# Patient Record
Sex: FEMALE | Race: WHITE
[De-identification: names, ages, dates, MRNs, and addresses within clinical notes are randomized per-mention and may not be internally consistent; named-entity substitution may affect disease eponyms.]

---

## 2018-08-14 ENCOUNTER — HOSPITAL ENCOUNTER (INPATIENT)
Dept: HOSPITAL 36 - ER | Age: 83
LOS: 1 days | Discharge: ANOTHER HOSPITAL | DRG: 885 | End: 2018-08-15
Attending: PSYCHIATRY & NEUROLOGY | Admitting: PSYCHIATRY & NEUROLOGY
Payer: MEDICARE

## 2018-08-14 VITALS — DIASTOLIC BLOOD PRESSURE: 77 MMHG | SYSTOLIC BLOOD PRESSURE: 110 MMHG

## 2018-08-14 DIAGNOSIS — M19.90: ICD-10-CM

## 2018-08-14 DIAGNOSIS — Z82.49: ICD-10-CM

## 2018-08-14 DIAGNOSIS — I10: ICD-10-CM

## 2018-08-14 DIAGNOSIS — I25.10: ICD-10-CM

## 2018-08-14 DIAGNOSIS — G30.9: ICD-10-CM

## 2018-08-14 DIAGNOSIS — G93.40: ICD-10-CM

## 2018-08-14 DIAGNOSIS — F02.80: ICD-10-CM

## 2018-08-14 DIAGNOSIS — F31.9: Primary | ICD-10-CM

## 2018-08-14 LAB
ALBUMIN SERPL-MCNC: 4.2 GM/DL (ref 3.7–5.3)
ALBUMIN/GLOB SERPL: 1.5 {RATIO} (ref 1–1.8)
ALP SERPL-CCNC: 71 U/L (ref 34–104)
ALT SERPL-CCNC: 9 U/L (ref 7–52)
AMPHET UR-MCNC: NEGATIVE NG/ML
ANION GAP SERPL CALC-SCNC: 10.5 MMOL/L (ref 7–16)
APAP SERPL-MCNC: < 10 UG/ML (ref 10–30)
APPEARANCE UR: (no result)
AST SERPL-CCNC: 14 U/L (ref 13–39)
BACTERIA #/AREA URNS HPF: (no result) /HPF
BARBITURATES UR-MCNC: NEGATIVE UG/ML
BASOPHILS # BLD AUTO: 0 TH/CUMM (ref 0–0.2)
BASOPHILS NFR BLD AUTO: 0.4 % (ref 0–2)
BENZODIAZEPINES PNL UR: NEGATIVE
BILIRUB SERPL-MCNC: 0.4 MG/DL (ref 0.3–1)
BILIRUB UR-MCNC: NEGATIVE MG/DL
BUN SERPL-MCNC: 28 MG/DL (ref 7–25)
CALCIUM SERPL-MCNC: 9.9 MG/DL (ref 8.6–10.3)
CANNABINOIDS SERPL QL CFM: NEGATIVE
CHLORIDE SERPL-SCNC: 106 MEQ/L (ref 98–107)
CHOLEST SERPL-MCNC: 212 MG/DL (ref ?–200)
CO2 SERPL-SCNC: 26.1 MEQ/L (ref 21–31)
COCAINE METAB.OTHER UR-MCNC: NEGATIVE NG/ML
COLOR UR: YELLOW
CREAT SERPL-MCNC: 1.1 MG/DL (ref 0.6–1.2)
EOSINOPHIL # BLD AUTO: 0 TH/CMM (ref 0.1–0.4)
EOSINOPHIL NFR BLD AUTO: 0.5 % (ref 0–5)
EPI CELLS URNS QL MICRO: (no result) /LPF
ERYTHROCYTE [DISTWIDTH] IN BLOOD BY AUTOMATED COUNT: 13.5 % (ref 11.5–20)
GLOBULIN SER-MCNC: 2.9 GM/DL
GLUCOSE SERPL-MCNC: 128 MG/DL (ref 70–105)
GLUCOSE UR STRIP-MCNC: NEGATIVE MG/DL
HBA1C MFR BLD: 5.7 % (ref 4–6)
HCT VFR BLD CALC: 43.1 % (ref 41–60)
HDLC SERPL-MCNC: 48 MG/DL (ref 23–92)
HGB BLD-MCNC: 14.5 GM/DL (ref 12–16)
HGB BLD-MCNC: 17 G/DL (ref 4–35)
KETONES UR STRIP-MCNC: NEGATIVE MG/DL
LEUKOCYTE ESTERASE UR-ACNC: (no result)
LYMPHOCYTE AB SER FC-ACNC: 1.8 TH/CMM (ref 1.5–3)
LYMPHOCYTES NFR BLD AUTO: 26 % (ref 20–50)
MCH RBC QN AUTO: 29.7 PG (ref 27–31)
MCHC RBC AUTO-ENTMCNC: 33.7 PG (ref 28–36)
MCV RBC AUTO: 88.2 FL (ref 81–100)
METHADONE UR CFM-MCNC: NEGATIVE NG/ML
METHAMPHET UR QL: NEGATIVE
MICRO URNS: YES
MONOCYTES # BLD AUTO: 0.5 TH/CMM (ref 0.3–1)
MONOCYTES NFR BLD AUTO: 7.7 % (ref 2–10)
NEUTROPHILS # BLD: 4.5 TH/CMM (ref 1.8–8)
NEUTROPHILS NFR BLD AUTO: 65.4 % (ref 40–80)
NITRITE UR QL STRIP: POSITIVE
OPIATES UR QL: NEGATIVE
PCP UR-MCNC: NEGATIVE UG/L
PH UR STRIP: 6 [PH] (ref 4.6–8)
PLATELET # BLD: 248 TH/CMM (ref 150–400)
PMV BLD AUTO: 8.1 FL
POTASSIUM SERPL-SCNC: 3.6 MEQ/L (ref 3.5–5.1)
PROT UR STRIP-MCNC: NEGATIVE MG/DL
RBC # BLD AUTO: 4.88 MIL/CMM (ref 3.8–5.2)
RBC # UR STRIP: (no result) /UL
RBC #/AREA URNS HPF: (no result) /HPF (ref 0–5)
SALICYLATES SERPL-MCNC: < 25 MG/L (ref 30–100)
SODIUM SERPL-SCNC: 139 MEQ/L (ref 136–145)
SP GR UR STRIP: 1.02 (ref 1–1.03)
TRICYCLICS UR QL: NEGATIVE
TRIGL SERPL-MCNC: 179 MG/DL (ref ?–150)
URINALYSIS COMPLETE PNL UR: (no result)
UROBILINOGEN UR STRIP-ACNC: 0.2 E.U./DL (ref 0.2–1)
WBC # BLD AUTO: 6.8 TH/CMM (ref 4.8–10.8)

## 2018-08-14 PROCEDURE — Z7610: HCPCS

## 2018-08-14 NOTE — ED PHYSICIAN CHART
ED Chief Complaint/HPI





- Patient Information


Date Seen:: 18


Time Seen:: 14:50


Chief Complaint:: Agitation


History of Present Illness:: 





onset x 3 days of agitation and aggressive behavior; no report of trauma, SIs, H

/As, neck pain, C/P, SOB, Abd. Pain, A/N/V/D/C, fever, chills, or urinary s/s


Historian:: Patient, EMS


Review:: Nurse's Note Reviewed, Old Chart Reviewed, EMS run form Reviewed





ED Review of Systems





- Review of Systems


General/Constitutional: No fever, No chills, No weight loss, No weakness, No 

diaphoresis, No edema, No loss of appetite


Skin: No skin lesions, No rash, No bruising


Head: No headache, No light-headedness


Eyes: No loss of vision, No pain, No diplopia


ENT: No earache, No nasal drainage, No sore throat, No tinnitus


Neck: No neck pain, No swelling, No thyromegaly, No stiffness, No mass noted


Cardio Vascular: No chest pain, No palpitations, No PND, No orthopnea, No edema


Pulmonary: No SOB, No cough, No sputum, No wheezing


GI: No nausea, No vomiting, No diarrhea, No pain, No melena, No hematochezia, 

No constipation, No hematemesis


G/U: No dysuria, No frequency, No hematuria


Ob/Gyn: No vaginal discharge, No abnormal vaginal bleed, No contraction


Musculoskeletal: No bone or joint pain, No back pain, No muscle pain


Endocrine: No polyuria, No polydipsia


Psychiatric: Prior psych history, No depression, Anxiety, No suicidal ideation, 

No homicidal ideation, No auditory hallucination, No visual hallucination


Hematopoietic: No bruising, No lymphadenopathy


Allergic/Immuno: No urticaria, No angioedema


Neurological: No syncope, No focal symptoms, No weakness, No paresthesia, No 

headache, No seizure, No dizziness, Confusion, No vertigo





ED Past Medical History





- Past Medical History


Obtainable: Yes


Past Medical History: HTN, CAD, Arthritis, Dementia


Family History: HTN


Social History: Non Smoker, No Alcohol, No Drug Use, , Care Facility


Surgical History: None


Psychiatricy History: Bipolar, Dementia


Medication: Reviewed





Family Medical History





- Family Member


  ** Mother


History Unknown: Yes


Living Status: 





ED Physical Exam





- Physical Examination


General/Constitutional: Awake, Well-developed, well-nourished, Alert, No 

distress, GCS 15, Non-toxic appearing, Ambulatory


Head: Atraumatic


Eyes: Lids, conjuctiva normal, PERRL, EOMI


Skin: Nl inspection, No rash, No skin lesions, No ecchymosis, Well hydrated, No 

lymphadenopathy


ENMT: External ears, nose nl, TM canals nl, Nasal exam nl, Lips, teeth, gums nl

, Oropharynx nl, Tonsils nl


Neck: Nontender, Full ROM w/o pain, No JVD, No nuchal rigidity, No bruit, No 

mass, No stridor


Respiratory: Nl effort/Exclusion, Clear to Auscultation, No Wheeze/Rhonchi/Rales


Cardio Vascular: RRR, No murmur, gallop, rubs, NL S1 S2, Carotid/Femoral/Distal 

pulses equal bilaterally


GI: No tenderness/rebounding/guarding, No organomegaly, No hernia, Normal BS's, 

Nondistended, No mass/bruits, No McBurney tenderness


: No CVA tenderness


Extremities: No tenderness or effusion, Full ROM, normal strength in all 

extremities, No edema, Normal digits & nails


Neuro/Psych: Alert/oriented, DTR's symmetric, Normal sensory exam, Normal motor 

strength, Judgement/insight normal, Mood normal, Normal gait, No focal deficits


Other Neuro/Psych comments:: 





Disoriented and Confused; + Psychomotor Agitation; no SIs; Mood/Affect: Stable


Misc: Normal back, No paraspinal tenderness





ED Labs/Radiology/EKG Results





- Lab Results


Comments:: 





Reviewed





- EKG Interpretations


Comments:: 





unable to perform due to hostile pt





ED Septic Shock





- .


Is Septic Shock (SBP<90, OR Lactate>4 mmol\L) present?: No





ED Reassessment (Disposition)





- Reassessment


Reassessment Condition:: Improved





- Diagnosis


Diagnosis:: 





Agitation; Dementia; Alzheimer's Disease; Medical Clearance; Bipolar Disorder





- Aftercare/Follow up Instructions


Aftercare/Follow-Up Instructions:: Counseled pt regarding lab results/diagnosis 

& need follow up, Counseled pt & family regarding lab results/diagnosis & need 

follow up





- Patient Disposition


Discharge/Transfer:: Acute Care w/in this hosp


Admitted to:: [facility]


Condition at Disposition:: Stable, Improved

## 2018-08-14 NOTE — HISTORY & PHYSICAL
ADMIT DATE:  08/14/2018



CHIEF COMPLAINT:  The patient was admitted because of aggressive behavior and

increasing agitation.



HISTORY OF PRESENT ILLNESS:  The patient has been having severe agitation and

aggressive behavior for the last 3 days, was brought to the Emergency Room.  She

was medically cleared, was admitted to the Geropsych Unit for aggressive

behavior and psychosis.



REVIEW OF SYSTEMS:  Essentially negative.



PAST MEDICAL HISTORY:  Hypertension, arthritis, coronary artery disease, and

dementia.



PHYSICAL EXAMINATION:

HEAD:  Normal.

ENT:  Normal.

NECK:  Supple, nontender.

LUNGS:  Clear.

CARDIOVASCULAR SYSTEM:  S1, S2 heard.

ABDOMEN:  Soft.  Bowel sounds are heard.

CENTRAL NERVOUS SYSTEM:  The patient is very confused and is agitated.



DIAGNOSES:  Acute agitation, encephalopathy, history of coronary artery disease,

history of hypertension, history of bipolar disorder, and history of arthritis

was made.



PLAN:  The patient is being admitted.  I will follow the patient medically and

psychiatrist will see the patient.





DD: 08/14/2018 18:53

DT: 08/14/2018 20:01

JOB# [id number]  [id number]

## 2018-08-15 ENCOUNTER — HOSPITAL ENCOUNTER (INPATIENT)
Dept: HOSPITAL 36 - MSI | Age: 83
LOS: 5 days | Discharge: NURSING, REHAB OR LONG-TERM CARE | DRG: 690 | End: 2018-08-20
Attending: FAMILY MEDICINE | Admitting: FAMILY MEDICINE
Payer: MEDICARE

## 2018-08-15 VITALS — SYSTOLIC BLOOD PRESSURE: 154 MMHG | DIASTOLIC BLOOD PRESSURE: 70 MMHG

## 2018-08-15 DIAGNOSIS — I10: ICD-10-CM

## 2018-08-15 DIAGNOSIS — M19.90: ICD-10-CM

## 2018-08-15 DIAGNOSIS — N39.0: Primary | ICD-10-CM

## 2018-08-15 DIAGNOSIS — Z66: ICD-10-CM

## 2018-08-15 DIAGNOSIS — E86.0: ICD-10-CM

## 2018-08-15 DIAGNOSIS — B96.20: ICD-10-CM

## 2018-08-15 DIAGNOSIS — K21.9: ICD-10-CM

## 2018-08-15 DIAGNOSIS — I25.10: ICD-10-CM

## 2018-08-15 DIAGNOSIS — F03.91: ICD-10-CM

## 2018-08-15 PROCEDURE — Z7610: HCPCS

## 2018-08-15 PROCEDURE — X3401: HCPCS

## 2018-08-15 RX ADMIN — POTASSIUM CHLORIDE SCH MEQ: 750 TABLET, EXTENDED RELEASE ORAL at 09:40

## 2018-08-15 RX ADMIN — DEXTROSE AND SODIUM CHLORIDE SCH MLS/HR: 5; .45 INJECTION, SOLUTION INTRAVENOUS at 17:37

## 2018-08-15 RX ADMIN — POTASSIUM CHLORIDE SCH: 750 TABLET, EXTENDED RELEASE ORAL at 11:08

## 2018-08-15 NOTE — PSYCHIATRIC EVALUATION
DATE OF SERVICE:  08/15/2018



IDENTIFYING DATA:  The patient is an 86-year-old  woman, resident of

[facility].  Information obtained directly interviewing the patient as

well as reviewing the admission papers and they are reliable.



JUSTIFICATION OF HOSPITALIZATION:  The patient is admitted for her agitation,

confusion and striking out.



HISTORY OF PRESENT ILLNESS:  This patient is an 86-year-old  woman,

resident of [facility].  The patient is reported to have been agitated at

the facility and then they brought her over here.  Tried to interview the

patient, but the patient is noted to be very sedated and is not able to provide

much of information.  However, the patient has been responding to questioning

persistently, but the patient's responses are noted to be very slow.  The

patient has psychomotor retardation.



PAST PSYCHIATRIC HISTORY:  Details are not known.



MEDICAL HISTORY AND PHYSICAL EXAMINATION:  Requested done by Dr. Aragon.



SUBSTANCE ABUSE HISTORY:  Details are not known.



LEGAL PROBLEMS:  None at this time.



MENTAL STATUS EXAMINATION:  The patient is an 86-year-old thin built,

superficially cooperative, very drowsy.  Eye contact is poor.  Mood is noted to

be dysphoric.  The patient says psychotic symptoms could not be elicited.  The

patient at this time has been not able to provide much of any information.  The

patient is going to be maintained only on Ativan 0.5 mg on a p.r.n. basis. 

Possibly, the patient is going to be transferred to the Med/Surg Unit for

further medical stabilization before she is going to be taken care of her

psychiatric issues.



DIAGNOSTIC IMPRESSION:

AXIS I:  Dementia and behavioral change, secondary trait.

AXIS II:  None.

AXIS III:  As per Dr. Aragon.



IMMEDIATE TREATMENT PLAN:  The patient is going to be referred to the primary

care physician as possible transfer to the medical floor for stabilization.





DD: 08/15/2018 10:56

DT: 08/15/2018 15:33

JOB# [id number]  [id number]

## 2018-08-16 LAB
ALBUMIN SERPL-MCNC: 3.3 GM/DL (ref 3.7–5.3)
ALBUMIN/GLOB SERPL: 1.3 {RATIO} (ref 1–1.8)
ALP SERPL-CCNC: 53 U/L (ref 34–104)
ALT SERPL-CCNC: 8 U/L (ref 7–52)
ANION GAP SERPL CALC-SCNC: 8 MMOL/L (ref 7–16)
AST SERPL-CCNC: 14 U/L (ref 13–39)
BASOPHILS # BLD AUTO: 0 TH/CUMM (ref 0–0.2)
BASOPHILS NFR BLD AUTO: 0.6 % (ref 0–2)
BILIRUB SERPL-MCNC: 0.8 MG/DL (ref 0.3–1)
BUN SERPL-MCNC: 27 MG/DL (ref 7–25)
CALCIUM SERPL-MCNC: 9.2 MG/DL (ref 8.6–10.3)
CHLORIDE SERPL-SCNC: 107 MEQ/L (ref 98–107)
CO2 SERPL-SCNC: 25.7 MEQ/L (ref 21–31)
CREAT SERPL-MCNC: 0.9 MG/DL (ref 0.6–1.2)
EOSINOPHIL # BLD AUTO: 0.1 TH/CMM (ref 0.1–0.4)
EOSINOPHIL NFR BLD AUTO: 1.9 % (ref 0–5)
ERYTHROCYTE [DISTWIDTH] IN BLOOD BY AUTOMATED COUNT: 13.5 % (ref 11.5–20)
GLOBULIN SER-MCNC: 2.6 GM/DL
GLUCOSE SERPL-MCNC: 112 MG/DL (ref 70–105)
HCT VFR BLD CALC: 41.5 % (ref 41–60)
HGB BLD-MCNC: 14 GM/DL (ref 12–16)
LYMPHOCYTE AB SER FC-ACNC: 1.9 TH/CMM (ref 1.5–3)
LYMPHOCYTES NFR BLD AUTO: 37.7 % (ref 20–50)
MCH RBC QN AUTO: 30.2 PG (ref 27–31)
MCHC RBC AUTO-ENTMCNC: 33.8 PG (ref 28–36)
MCV RBC AUTO: 89.4 FL (ref 81–100)
MONOCYTES # BLD AUTO: 0.5 TH/CMM (ref 0.3–1)
MONOCYTES NFR BLD AUTO: 10.3 % (ref 2–10)
NEUTROPHILS # BLD: 2.6 TH/CMM (ref 1.8–8)
NEUTROPHILS NFR BLD AUTO: 49.5 % (ref 40–80)
PLATELET # BLD: 221 TH/CMM (ref 150–400)
PMV BLD AUTO: 8.7 FL
POTASSIUM SERPL-SCNC: 3.7 MEQ/L (ref 3.5–5.1)
RBC # BLD AUTO: 4.64 MIL/CMM (ref 3.8–5.2)
SODIUM SERPL-SCNC: 137 MEQ/L (ref 136–145)
WBC # BLD AUTO: 5.1 TH/CMM (ref 4.8–10.8)

## 2018-08-16 RX ADMIN — DEXTROSE AND SODIUM CHLORIDE SCH MLS/HR: 5; .45 INJECTION, SOLUTION INTRAVENOUS at 12:59

## 2018-08-16 RX ADMIN — POTASSIUM CHLORIDE SCH MEQ: 750 TABLET, EXTENDED RELEASE ORAL at 16:33

## 2018-08-16 NOTE — GENERAL PROGRESS NOTE
Objective





- Results


Result Diagrams: 


 08/16/18 05:30





 08/16/18 05:30


Recent Labs: 


 Laboratory Last Values











WBC  5.1 Th/cmm (4.8-10.8)   08/16/18  05:30    


 


RBC  4.64 Mil/cmm (3.80-5.20)   08/16/18  05:30    


 


Hgb  14.0 gm/dL (12-16)   08/16/18  05:30    


 


Hct  41.5 % (41.0-60)   08/16/18  05:30    


 


MCV  89.4 fl ()   08/16/18  05:30    


 


MCH  30.2 pg (27.0-31.0)   08/16/18  05:30    


 


MCHC Differential  33.8 pg (28.0-36.0)   08/16/18  05:30    


 


RDW  13.5 % (11.5-20.0)   08/16/18  05:30    


 


Plt Count  221 Th/cmm (150-400)   08/16/18  05:30    


 


MPV  8.7 fl  08/16/18  05:30    


 


Neutrophils %  49.5 % (40.0-80.0)   08/16/18  05:30    


 


Lymphocytes %  37.7 % (20.0-50.0)   08/16/18  05:30    


 


Monocytes %  10.3 % (2.0-10.0)  H  08/16/18  05:30    


 


Eosinophils %  1.9 % (0.0-5.0)   08/16/18  05:30    


 


Basophils %  0.6 % (0.0-2.0)   08/16/18  05:30    


 


Sodium  137 mEq/L (136-145)   08/16/18  05:30    


 


Potassium  3.7 mEq/L (3.5-5.1)   08/16/18  05:30    


 


Chloride  107 mEq/L ()   08/16/18  05:30    


 


Carbon Dioxide  25.7 mEq/L (21.0-31.0)   08/16/18  05:30    


 


Anion Gap  8.0  (7.0-16.0)   08/16/18  05:30    


 


BUN  27 mg/dL (7-25)  H  08/16/18  05:30    


 


Creatinine  0.9 mg/dL (0.6-1.2)   08/16/18  05:30    


 


Est GFR ( Amer)  TNP   08/16/18  05:30    


 


Est GFR (Non-Af Amer)  TNP   08/16/18  05:30    


 


BUN/Creatinine Ratio  30.0   08/16/18  05:30    


 


Glucose  112 mg/dL ()  H  08/16/18  05:30    


 


POC Glucose  88 MG/DL (70 - 105)   08/15/18  16:10    


 


Calcium  9.2 mg/dL (8.6-10.3)   08/16/18  05:30    


 


Total Bilirubin  0.8 mg/dL (0.3-1.0)   08/16/18  05:30    


 


AST  14 U/L (13-39)   08/16/18  05:30    


 


ALT  8 U/L (7-52)   08/16/18  05:30    


 


Alkaline Phosphatase  53 U/L ()   08/16/18  05:30    


 


Total Protein  5.9 gm/dL (6.0-8.3)  L  08/16/18  05:30    


 


Albumin  3.3 gm/dL (3.7-5.3)  L  08/16/18  05:30    


 


Globulin  2.6 gm/dL  08/16/18  05:30    


 


Albumin/Globulin Ratio  1.3  (1.0-1.8)   08/16/18  05:30    














- Physical Exam


Vitals and I&O: 


 Vital Signs











Temp  97.2 F   08/16/18 14:53


 


Pulse  85   08/16/18 14:53


 


Resp  18   08/16/18 14:53


 


BP  130/62   08/16/18 14:53


 


Pulse Ox  98   08/16/18 14:53








 Intake & Output











 08/15/18 08/16/18 08/16/18





 18:59 06:59 18:59


 


Intake Total 0  968.333


 


Balance 0  968.333


 


Weight (lbs) 60.781 kg 61.689 kg 


 


Intake:   


 


  Intake, IV Amount   968.333


 


    D5-0.45NS 1,000 ml @ 50   968.333





    mls/hr IV .Q20H [facility] Rx#:   





    [id number]   


 


  Oral 0  


 


Other:   


 


  # Voids 1  


 


  # Bowel Movements 1  


 


  Stool Characteristics Soft  Soft


 


  Weight Source Bedscale Bedscale 











Active Medications: 


Current Medications





Amlodipine Besylate (Norvasc)  10 mg PO HS RUSS


   Stop: 10/15/18 20:59


Bisacodyl (Dulcolax 10 Mg Supp)  10 mg RC DAILY PRN


   PRN Reason: Constipation


   Stop: 10/15/18 09:02


Clonidine HCl (Catapres-Tts-2)  1 patch TD Fr RUSS


   Stop: 10/16/18 09:02


Clopidogrel Bisulfate (Plavix)  75 mg PO DAILY RUSS


   Stop: 10/16/18 08:59


Divalproex Sodium (Depakote Dr)  125 mg PO DAILY RUSS; Protocol


   Stop: 10/16/18 08:59


Divalproex Sodium (Depakote Dr)  125 mg PO HS RUSS; Protocol


   Stop: 10/15/18 20:59


Docusate Sodium (Colace)  100 mg PO BID RUSS


   Stop: 10/15/18 16:59


Famotidine (Pepcid)  20 mg PO QAM RUSS


   Stop: 10/16/18 08:59


Hydrochlorothiazide (Hctz)  25 mg PO DAILY RUSS


   Stop: 10/16/18 08:59


Dextrose/Sodium Chloride (D5-0.45ns)  1,000 mls @ 50 mls/hr IV .Q20H R[facility]


   Stop: 10/14/18 16:29


   Last Admin: 08/16/18 12:59 Dose:  50 mls/hr


Ceftriaxone Sodium 1 gm/ (Sodium Chloride)  50 mls @ 100 mls/hr IV Q24HR RUSS


Lorazepam (Ativan)  0.5 mg PO Q4HR PRN; Protocol


   PRN Reason: Anxiety


   Stop: 10/15/18 09:02


Losartan Potassium (Cozaar)  100 mg PO DAILY RUSS


   Stop: 10/16/18 08:59


Magnesium Hydroxide (Milk Of Magnesia)  30 ml PO HS RUSS


   Stop: 10/15/18 20:59


Magnesium Oxide (Mag-Oxide)  400 mg PO DAILY RUSS


   Stop: 10/16/18 08:59


Medroxyprogesterone Acetate (Provera)  10 mg PO DAILY RUSS; Protocol


   Stop: 10/16/18 08:59


Medroxyprogesterone Acetate (Provera)  10 mg PO DAILY RUSS; Protocol


   Stop: 10/16/18 08:59


Metoprolol Tartrate (Lopressor)  100 mg PO BID RUSS


   Stop: 10/15/18 16:59


Multivitamins/Vitamin C (Theragran)  1 tab PO DAILY RUSS


   Stop: 10/16/18 08:59


Potassium Chloride (Klor-Con)  10 meq PO BID RUSS


   Stop: 10/15/18 16:59


Quetiapine Fumarate (Seroquel)  12.5 mg PO HS RUSS; Protocol


   Stop: 10/15/18 20:59


Zolpidem Tartrate (Ambien)  5 mg PO HS PRN


   PRN Reason: Insomnia


   Stop: 10/15/18 09:02

## 2018-08-17 LAB
ALBUMIN SERPL-MCNC: 3.3 GM/DL (ref 3.7–5.3)
ALBUMIN/GLOB SERPL: 1.3 {RATIO} (ref 1–1.8)
ALP SERPL-CCNC: 52 U/L (ref 34–104)
ALT SERPL-CCNC: 7 U/L (ref 7–52)
ANION GAP SERPL CALC-SCNC: 9.1 MMOL/L (ref 7–16)
AST SERPL-CCNC: 12 U/L (ref 13–39)
BASOPHILS # BLD AUTO: 0 TH/CUMM (ref 0–0.2)
BASOPHILS NFR BLD AUTO: 0.3 % (ref 0–2)
BILIRUB SERPL-MCNC: 0.5 MG/DL (ref 0.3–1)
BUN SERPL-MCNC: 17 MG/DL (ref 7–25)
CALCIUM SERPL-MCNC: 9.2 MG/DL (ref 8.6–10.3)
CHLORIDE SERPL-SCNC: 106 MEQ/L (ref 98–107)
CO2 SERPL-SCNC: 25.5 MEQ/L (ref 21–31)
CREAT SERPL-MCNC: 0.8 MG/DL (ref 0.6–1.2)
EOSINOPHIL # BLD AUTO: 0.1 TH/CMM (ref 0.1–0.4)
EOSINOPHIL NFR BLD AUTO: 1.4 % (ref 0–5)
ERYTHROCYTE [DISTWIDTH] IN BLOOD BY AUTOMATED COUNT: 13.3 % (ref 11.5–20)
GLOBULIN SER-MCNC: 2.6 GM/DL
GLUCOSE SERPL-MCNC: 105 MG/DL (ref 70–105)
HCT VFR BLD CALC: 42.6 % (ref 41–60)
HGB BLD-MCNC: 14.4 GM/DL (ref 12–16)
LYMPHOCYTE AB SER FC-ACNC: 2.6 TH/CMM (ref 1.5–3)
LYMPHOCYTES NFR BLD AUTO: 41.2 % (ref 20–50)
MCH RBC QN AUTO: 30.3 PG (ref 27–31)
MCHC RBC AUTO-ENTMCNC: 33.8 PG (ref 28–36)
MCV RBC AUTO: 89.7 FL (ref 81–100)
MONOCYTES # BLD AUTO: 0.7 TH/CMM (ref 0.3–1)
MONOCYTES NFR BLD AUTO: 10.2 % (ref 2–10)
NEUTROPHILS # BLD: 3 TH/CMM (ref 1.8–8)
NEUTROPHILS NFR BLD AUTO: 46.9 % (ref 40–80)
PLATELET # BLD: 209 TH/CMM (ref 150–400)
PMV BLD AUTO: 8.8 FL
POTASSIUM SERPL-SCNC: 3.6 MEQ/L (ref 3.5–5.1)
RBC # BLD AUTO: 4.75 MIL/CMM (ref 3.8–5.2)
SODIUM SERPL-SCNC: 137 MEQ/L (ref 136–145)
WBC # BLD AUTO: 6.4 TH/CMM (ref 4.8–10.8)

## 2018-08-17 RX ADMIN — POTASSIUM CHLORIDE SCH MEQ: 750 TABLET, EXTENDED RELEASE ORAL at 10:05

## 2018-08-17 RX ADMIN — MAGNESIUM HYDROXIDE SCH ML: 400 SUSPENSION ORAL at 00:42

## 2018-08-17 RX ADMIN — DEXTROSE AND SODIUM CHLORIDE SCH MLS/HR: 5; .45 INJECTION, SOLUTION INTRAVENOUS at 12:36

## 2018-08-17 RX ADMIN — MAGNESIUM HYDROXIDE SCH ML: 400 SUSPENSION ORAL at 21:48

## 2018-08-17 RX ADMIN — POTASSIUM CHLORIDE SCH MEQ: 750 TABLET, EXTENDED RELEASE ORAL at 18:48

## 2018-08-17 NOTE — CONSULTATION
Consult Note





- Consult Note


Service Date: 08/17/18


Referring Physician: Archana Aragon


Consult Note: 


PHYSICIAN Consultation Note:





Date of Admission: 08/15/18





Purpose of Consultation: UTI.





Chief Complaint: Patient RODY KINGSLEY was admitted to location Medical/

Surgical Unit I with DEHYDRATION.





History of Present Illness:


86 year female with past medical history of Hypertension, arthritis, carotid 

disease, dementia brought to the hospital for severe agitation.  She was 

admitted to the geropsychiatric unit.  On further workup it was found that 

patient have UTI and urine culture growing Escherichia coli.  Patient was 

transferred to the acute care unit for antibiotic management.  Patient is 

unable to give any history.








Past Medical History: Hypertension, arthritis, carotid disease, dementia.





Allergies











Allergy/AdvReac Type Severity Reaction Status Date / Time


 


No Known Allergies Allergy   Verified 08/14/18 15:09








 Vital Signs











Temp  97.4 F   08/17/18 16:00


 


Pulse  96   08/17/18 18:49


 


Resp  18   08/17/18 16:00


 


BP  130/79   08/17/18 18:49


 


Pulse Ox  96   08/17/18 16:00








 Intake & Output











 08/17/18 08/17/18 08/18/18





 06:59 18:59 06:59


 


Intake Total  1000 


 


Balance  1000 


 


Weight (lbs) 61.689 kg  


 


Intake:   


 


  Intake, IV Amount  1000 


 


    D5-0.45NS 1,000 ml @ 50  1000 





    mls/hr IV .Q20H [facility] Rx#:   





    [id number]   


 


Other:   


 


  Weight Source Estimated  








 Laboratory Results - last 24 hr











  08/17/18 08/17/18





  04:35 04:35


 


WBC  6.4  D 


 


RBC  4.75 


 


Hgb  14.4 


 


Hct  42.6 


 


MCV  89.7 


 


MCH  30.3 


 


MCHC Differential  33.8 


 


RDW  13.3 


 


Plt Count  209 


 


MPV  8.8 


 


Neutrophils %  46.9 


 


Lymphocytes %  41.2 


 


Monocytes %  10.2 H 


 


Eosinophils %  1.4 


 


Basophils %  0.3 


 


Sodium   137


 


Potassium   3.6


 


Chloride   106


 


Carbon Dioxide   25.5


 


Anion Gap   9.1


 


BUN   17


 


Creatinine   0.8


 


Est GFR ( Amer)   TNP


 


Est GFR (Non-Af Amer)   TNP


 


BUN/Creatinine Ratio   21.3


 


Glucose   105


 


Calcium   9.2


 


Total Bilirubin   0.5


 


AST   12 L


 


ALT   7


 


Alkaline Phosphatase   52


 


Total Protein   5.9 L


 


Albumin   3.3 L


 


Globulin   2.6


 


Albumin/Globulin Ratio   1.3








Home Medication











 Medication  Instructions  Recorded  Type


 


Metoprolol Tartrate 100 mg PO BID 08/14/18 History


 


Potassium Chloride [Klor-Con 10] 10 meq PO BID 08/14/18 History


 


medroxyPROGESTERone Acetate 10 mg PO DAILY 08/14/18 History





[Provera]   


 


Bisacodyl [Dulcolax 10 Mg Supp] 10 mg RC DAILY PRN  sup 08/15/18 Rx


 


Clopidogrel [Plavix] 75 mg PO DAILY  tab 08/15/18 Rx


 


Divalproex DR [Depakote DR] 125 mg PO DAILY  tcp 08/15/18 Rx


 


Divalproex DR [Depakote DR] 125 mg PO HS  tcp 08/15/18 Rx


 


Docusate Sodium [Colace] 100 mg PO BID  cap 08/15/18 Rx


 


Famotidine [Pepcid] 20 mg PO QAM  tab 08/15/18 Rx


 


Hydrochlorothiazide [Hctz*] 25 mg PO DAILY  tab 08/15/18 Rx


 


Lorazepam [Ativan] 0.5 mg PO Q4HR PRN  tab 08/15/18 Rx


 


Losartan Potassium [Cozaar] 100 mg PO DAILY  tab 08/15/18 Rx


 


Magnesium Hydroxide [Milk of 30 ml PO HS  udc 08/15/18 Rx





Magnesia]   


 


Magnesium Oxide [Mag-Oxide] 400 mg PO DAILY  tab 08/15/18 Rx


 


Metoprolol Tartrate [Lopressor] 100 mg PO BID  tab 08/15/18 Rx


 


Multivitamin [Theragran] 1 tab PO DAILY  tab 08/15/18 Rx


 


Potassium Chloride ER [Klor-Con] 10 meq PO BID  ter 08/15/18 Rx


 


QUEtiapine Fumarate [SEROquel] 12.5 mg PO HS  tab 08/15/18 Rx


 


Zolpidem Tartrate [Ambien] 5 mg PO HS PRN  tab 08/15/18 Rx


 


amLODIPine Besylate [Norvasc*] 10 mg PO HS  tab 08/15/18 Rx


 


cloNIDine 0.2 mg/24 hr 1 patch TD Fr  patch 08/15/18 Rx





[Catapres-TTS-2*]   


 


medroxyPROGESTERone Acetate 10 mg PO DAILY  tab 08/15/18 Rx





[Provera]   








Current Medications











Generic Name Dose Route Start Last Admin





  Trade Name Freq  PRN Reason Stop Dose Admin


 


Amlodipine Besylate  10 mg  08/16/18 21:00  08/16/18 21:41





  Norvasc  PO  10/15/18 20:59  10 mg





  HS RUSS   Administration





     





     





     





     


 


Bisacodyl  10 mg  08/16/18 09:03  





  Dulcolax 10 Mg Supp  RC  10/15/18 09:02  





  DAILY PRN   





  Constipation   





     





     





     


 


Clonidine HCl  1 patch  08/17/18 09:03  08/17/18 10:10





  Catapres-Tts-2  TD  10/16/18 09:02  1 patch





  Fr RUSS   Administration





     





     





     





     


 


Clopidogrel Bisulfate  75 mg  08/17/18 09:00  08/17/18 10:05





  Plavix  PO  10/16/18 08:59  75 mg





  DAILY RUSS   Administration





     





     





     





     


 


Divalproex Sodium  125 mg  08/17/18 09:00  08/17/18 10:05





  Depakote Dr  PO  10/16/18 08:59  125 mg





  DAILY RUSS   Administration





     





     





  Protocol   





     


 


Divalproex Sodium  125 mg  08/17/18 21:00  





  Depakote Dr  PO  10/15/18 20:59  





  HS RUSS   





     





     





  Protocol   





     


 


Docusate Sodium  100 mg  08/16/18 17:00  08/17/18 18:48





  Colace  PO  10/15/18 16:59  100 mg





  BID RUSS   Administration





     





     





     





     


 


Famotidine  20 mg  08/17/18 09:00  08/17/18 10:00





  Pepcid  PO  10/16/18 08:59  20 mg





  QAM RUSS   Administration





     





     





     





     


 


Hydrochlorothiazide  25 mg  08/17/18 09:00  08/17/18 11:54





  Hctz  PO  10/16/18 08:59  25 mg





  DAILY RUSS   Administration





     





     





     





     


 


Dextrose/Sodium Chloride  1,000 mls @ 50 mls/hr  08/15/18 16:30  08/17/18 12:36





  D5-0.45ns  IV  10/14/18 16:29  50 mls/hr





  .Q20H RUSS   Administration





     





     





     





     


 


Ceftriaxone Sodium 1 gm/  50 mls @ 100 mls/hr  08/16/18 17:00  08/17/18 18:48





  Sodium Chloride  IV   100 mls/hr





  Q24HR RUSS   Administration





     





     





     





     


 


Lorazepam  0.5 mg  08/16/18 09:03  08/17/18 12:34





  Ativan  PO  10/15/18 09:02  0.5 mg





  Q4HR PRN   Administration





  Anxiety   





     





  Protocol   





     


 


Losartan Potassium  100 mg  08/17/18 09:00  08/17/18 10:05





  Cozaar  PO  10/16/18 08:59  100 mg





  DAILY RUSS   Administration





     





     





     





     


 


Magnesium Hydroxide  30 ml  08/16/18 21:00  08/17/18 00:42





  Milk Of Magnesia  PO  10/15/18 20:59  30 ml





  HS RUSS   Administration





     





     





     





     


 


Magnesium Oxide  400 mg  08/17/18 09:00  08/17/18 10:05





  Mag-Oxide  PO  10/16/18 08:59  400 mg





  DAILY RUSS   Administration





     





     





     





     


 


Medroxyprogesterone Acetate  10 mg  08/17/18 09:00  08/17/18 10:20





  Provera  PO  10/16/18 08:59  10 mg





  DAILY RUSS   Administration





     





     





  Protocol   





     


 


Metoprolol Tartrate  100 mg  08/16/18 17:00  08/17/18 18:49





  Lopressor  PO  10/15/18 16:59  100 mg





  BID RUSS   Administration





     





     





     





     


 


Multivitamins/Vitamin C  1 tab  08/17/18 09:00  08/17/18 10:05





  Theragran  PO  10/16/18 08:59  1 tab





  DAILY RUSS   Administration





     





     





     





     


 


Potassium Chloride  10 meq  08/16/18 17:00  08/17/18 18:48





  Klor-Con  PO  10/15/18 16:59  10 meq





  BID RUSS   Administration





     





     





     





     


 


Quetiapine Fumarate  12.5 mg  08/16/18 21:00  08/16/18 21:41





  Seroquel  PO  10/15/18 20:59  12.5 mg





  HS RUSS   Administration





     





     





  Protocol   





     


 


Zolpidem Tartrate  5 mg  08/16/18 09:03  





  Ambien  PO  10/15/18 09:02  





  HS PRN   





  Insomnia   





     





     





     








Review of Systems:


A 12 point ROS was reviewed with the pertinent positive and negatives noted in 

the HPI.





Social History





Smoking Status                   Never smoker


Drug Use                         No


Alcohol Use                      No





Family Medical History





Family Medical History                                     Start:  08/15/18 15:

09


Freq:   ONCE                                               Status: Active      

  


Protocol:                                                                      

  


 Document     08/15/18 15:36  PTSAI  (Rec: 08/15/18 15:36  PTSAI  [id number])


 Family Medical History


     Mother


      History Unknown                            Yes





Physical Exam:





General: Comfortable, not in acute distress.





HEENT: Head: Normocephalic, atraumatic.  Oral cavity: Moist, pink tongue.  Eyes

: Pallor is present icterus.  Pupil PERRLA EOMI.





Neck: Supple, no JVD.  No use of accessory neck muscles.





Cardio: S1 and S2 within normal limits regular rhythm no murmur no gallop.





Respiratory: Vesicular breath sounds.  No crackles or wheezing.





Abdominal: Soft, nontender nondistended bowel sounds present.





Genital/Urinary: Deferred.





Extremities: No cyanosis, no clubbing, no edema.





Neurological: Alert, awake.





Assessment: 


1.  UTI.  Escherichia coli UTI.


2.  Dementia.


3.  Hypertension.


4.  Arthritis.





Plan: 


Will continue Rocephin which can be changed to doxycycline 100 mg by mouth 

twice a day for total 7 days from now.





Thank you, Dr. Aragon for involving me in taking care of this patient.








Signed,





Rodriguez Turpin M.D.


[id number]/17/[id number]

## 2018-08-18 RX ADMIN — POTASSIUM CHLORIDE SCH MEQ: 750 TABLET, EXTENDED RELEASE ORAL at 08:17

## 2018-08-18 RX ADMIN — DEXTROSE AND SODIUM CHLORIDE SCH MLS/HR: 5; .45 INJECTION, SOLUTION INTRAVENOUS at 10:40

## 2018-08-18 RX ADMIN — MAGNESIUM HYDROXIDE SCH ML: 400 SUSPENSION ORAL at 20:49

## 2018-08-18 RX ADMIN — POTASSIUM CHLORIDE SCH MEQ: 750 TABLET, EXTENDED RELEASE ORAL at 16:18

## 2018-08-18 NOTE — HISTORY & PHYSICAL
ADMIT DATE:  08/15/2018



CHIEF COMPLAINT:  Poor oral intake and weakness.



HISTORY OF PRESENT ILLNESS:  This is an 86-year-old female  who was

originally admitted to [facility] mental health unit due to increase in agitation,

but apparently the patient has been noted to have weakness and poor oral intake,

so [facility].



REVIEW OF SYSTEMS:

GENERAL:  This is an 86-year-old female that appears as stated.  No fever. 

Positive weakness.

HEENT:  No headache.  No dizziness.

EYES:  No eye pain, no blurring of vision.

NECK:  No neck pain.  No nuchal rigidity.

CHEST:  No chest pain.  No palpitation.

PULMONARY:  No coughing.  No shortness of breath.

GASTROINTESTINAL:  No abdominal pain, no constipation or diarrhea.

MUSCULOSKELETAL:  No joint pain.  No muscle pain.



SOCIAL HISTORY:  The patient lives in a skilled nursing facility prior to

hospitalization.  No nicotine, alcohol, or illicit drug use.



PSYCHIATRIC HISTORY:  Includes dementia.



FAMILY HISTORY:  Unremarkable.



PAST SURGICAL HISTORY:  Unremarkable.



PAST MEDICAL HISTORY:  Includes coronary artery disease, hypertension, and

gastroesophageal reflux disease.



PHYSICAL EXAMINATION:

VITAL SIGNS:  Temperature 97.8, heart rate 78, blood pressure 156/79, and

respirations 18, and 96% on room air.

HEENT:  Head is atraumatic, normocephalic.  Eyes, bilateral conjunctivae are

clear.  Bilateral pupils equal, round, and reactive.

NECK:  Supple.  No JVD.

CARDIOVASCULAR:  S1 and S2, without murmur.

LUNGS:  Clear to auscultation.

GASTROINTESTINAL:  Soft and nontender without guarding.  Positive bowel sounds.

MUSCULOSKELETAL:  No clubbing, no cyanosis noted.



ASSESSMENT:

1.  Dehydration.

2.  Urinary tract infection.

3.  Dementia.

4.  Coronary artery disease.

5.  Hypertension.

6.  Gastroesophageal reflux disease.



PLAN:  We will consult with ID doctor for antibiotic management.  We will put

the patient in aspiration precaution.  We will hydrate the patient with IV

fluids.  We will do medication reconciliation accordingly.  Treatment plans were

discussed with the patient's nurse.  Treatment plans were discussed with Dr. Aragon.





DD: 08/18/2018 08:37

DT: 08/18/2018 09:28

JOB# [id number]  [id number]

## 2018-08-19 RX ADMIN — DEXTROSE AND SODIUM CHLORIDE SCH MLS/HR: 5; .45 INJECTION, SOLUTION INTRAVENOUS at 08:17

## 2018-08-19 RX ADMIN — POTASSIUM CHLORIDE SCH MEQ: 750 TABLET, EXTENDED RELEASE ORAL at 08:11

## 2018-08-19 RX ADMIN — POTASSIUM CHLORIDE SCH MEQ: 750 TABLET, EXTENDED RELEASE ORAL at 16:19

## 2018-08-19 RX ADMIN — MAGNESIUM HYDROXIDE SCH ML: 400 SUSPENSION ORAL at 20:15

## 2018-08-19 NOTE — GENERAL PROGRESS NOTE
Subjective





- Review of Systems


Events since last encounter: 





patient awake 


weak


poor appetite 





Objective





- Results


Result Diagrams: 


 18 04:35





 18 04:35


Recent Labs: 


 Laboratory Last Values











WBC  6.4 Th/cmm (4.8-10.8)  D 18  04:35    


 


RBC  4.75 Mil/cmm (3.80-5.20)   18  04:35    


 


Hgb  14.4 gm/dL (12-16)   18  04:35    


 


Hct  42.6 % (41.0-60)   18  04:35    


 


MCV  89.7 fl ()   18  04:35    


 


MCH  30.3 pg (27.0-31.0)   18  04:35    


 


MCHC Differential  33.8 pg (28.0-36.0)   18  04:35    


 


RDW  13.3 % (11.5-20.0)   18  04:35    


 


Plt Count  209 Th/cmm (150-400)   18  04:35    


 


MPV  8.8 fl  18  04:35    


 


Neutrophils %  46.9 % (40.0-80.0)   18  04:35    


 


Lymphocytes %  41.2 % (20.0-50.0)   18  04:35    


 


Monocytes %  10.2 % (2.0-10.0)  H  18  04:35    


 


Eosinophils %  1.4 % (0.0-5.0)   18  04:35    


 


Basophils %  0.3 % (0.0-2.0)   18  04:35    


 


Sodium  137 mEq/L (136-145)   18  04:35    


 


Potassium  3.6 mEq/L (3.5-5.1)   18  04:35    


 


Chloride  106 mEq/L ()   18  04:35    


 


Carbon Dioxide  25.5 mEq/L (21.0-31.0)   18  04:35    


 


Anion Gap  9.1  (7.0-16.0)   18  04:35    


 


BUN  17 mg/dL (7-25)   18  04:35    


 


Creatinine  0.8 mg/dL (0.6-1.2)   18  04:35    


 


Est GFR ( Amer)  TNP   18  04:35    


 


Est GFR (Non-Af Amer)  TNP   18  04:35    


 


BUN/Creatinine Ratio  21.3   18  04:35    


 


Glucose  105 mg/dL ()   18  04:35    


 


POC Glucose  88 MG/DL (70 - 105)   08/15/18  16:10    


 


Calcium  9.2 mg/dL (8.6-10.3)   18  04:35    


 


Total Bilirubin  0.5 mg/dL (0.3-1.0)   18  04:35    


 


AST  12 U/L (13-39)  L  18  04:35    


 


ALT  7 U/L (7-52)   18  04:35    


 


Alkaline Phosphatase  52 U/L ()   18  04:35    


 


Total Protein  5.9 gm/dL (6.0-8.3)  L  18  04:35    


 


Albumin  3.3 gm/dL (3.7-5.3)  L  18  04:35    


 


Globulin  2.6 gm/dL  18  04:35    


 


Albumin/Globulin Ratio  1.3  (1.0-1.8)   18  04:35    














- Physical Exam


Vitals and I&O: 


 Vital Signs











Temp  96.7 F   18 08:00


 


Pulse  64   18 08:10


 


Resp  18   18 08:00


 


BP  145/62   18 08:11


 


Pulse Ox  97   18 08:00








 Intake & Output











 18





 18:59 06:59 18:59


 


Intake Total 1000 1750 


 


Balance 1000 1750 


 


Weight (lbs)  59.738 kg 


 


Intake:   


 


  Intake, IV Amount 1000 1000 


 


    D5-0.45NS 1,000 ml @ 50 1000 1000 





    mls/hr IV .Q20H [facility] Rx#:   





    [id number]   


 


  Oral  750 


 


Other:   


 


  # Voids  2 


 


  # Bowel Movements  1 


 


  Weight Source  Bedscale 











Active Medications: 


Current Medications





Amlodipine Besylate (Norvasc)  10 mg PO HS [facility]


   Stop: 10/15/18 20:59


   Last Admin: 18 20:48 Dose:  10 mg


Bisacodyl (Dulcolax 10 Mg Supp)  10 mg RC DAILY PRN


   PRN Reason: Constipation


   Stop: 10/15/18 09:02


Clonidine HCl (Catapres-Tts-2)  1 patch TD Fr [facility]


   Stop: 10/16/18 09:02


   Last Admin: 18 10:10 Dose:  1 patch


Clopidogrel Bisulfate (Plavix)  75 mg PO DAILY [facility]


   Stop: 10/16/18 08:59


   Last Admin: 18 08:11 Dose:  75 mg


Divalproex Sodium (Depakote Dr)  125 mg PO DAILY [facility]; Protocol


   Stop: 10/16/18 08:59


   Last Admin: 18 08:11 Dose:  125 mg


Divalproex Sodium (Depakote Dr)  125 mg PO HS [facility]; Protocol


   Stop: 10/15/18 20:59


   Last Admin: 18 20:49 Dose:  125 mg


Docusate Sodium (Colace)  100 mg PO BID [facility]


   Stop: 10/15/18 16:59


   Last Admin: 18 08:11 Dose:  100 mg


Famotidine (Pepcid)  20 mg PO QAM [facility]


   Stop: 10/16/18 08:59


   Last Admin: 18 08:11 Dose:  20 mg


Hydrochlorothiazide (Hctz)  25 mg PO DAILY [facility]


   Stop: 10/16/18 08:59


   Last Admin: 18 08:11 Dose:  25 mg


Dextrose/Sodium Chloride (D5-0.45ns)  1,000 mls @ 50 mls/hr IV .Q20H [facility]


   Stop: 10/14/18 16:29


   Last Admin: 18 08:17 Dose:  50 mls/hr


Ceftriaxone Sodium 1 gm/ (Sodium Chloride)  50 mls @ 100 mls/hr IV Q24HR [facility]


   Last Admin: 18 16:18 Dose:  100 mls/hr


Lorazepam (Ativan)  0.5 mg PO Q4HR [facility]; Protocol


   PRN Reason: Anxiety


   Stop: 10/15/18 09:02


   Last Admin: 18 12:28 Dose:  0.5 mg


Losartan Potassium (Cozaar)  100 mg PO DAILY [facility]


   Stop: 10/16/18 08:59


   Last Admin: 18 08:10 Dose:  100 mg


Magnesium Hydroxide (Milk Of Magnesia)  30 ml PO HS [facility]


   Stop: 10/15/18 20:59


   Last Admin: 18 20:49 Dose:  30 ml


Magnesium Oxide (Mag-Oxide)  400 mg PO DAILY [facility]


   Stop: 10/16/18 08:59


   Last Admin: 18 08:11 Dose:  400 mg


Medroxyprogesterone Acetate (Provera)  10 mg PO DAILY [facility]; Protocol


   Stop: 10/16/18 08:59


   Last Admin: 18 08:10 Dose:  10 mg


Metoprolol Tartrate (Lopressor)  100 mg PO BID [facility]


   Stop: 10/15/18 16:59


   Last Admin: 18 08:10 Dose:  100 mg


Multivitamins/Vitamin C (Theragran)  1 tab PO DAILY [facility]


   Stop: 10/16/18 08:59


   Last Admin: 18 08:11 Dose:  1 tab


Potassium Chloride (Klor-Con)  10 meq PO BID [facility]


   Stop: 10/15/18 16:59


   Last Admin: 18 08:11 Dose:  10 meq


Quetiapine Fumarate (Seroquel)  12.5 mg PO HS [facility]; Protocol


   Stop: 10/15/18 20:59


   Last Admin: 18 20:49 Dose:  12.5 mg


Zolpidem Tartrate (Ambien)  5 mg PO HS PRN


   PRN Reason: Insomnia


   Stop: 10/15/18 09:02











Nutritional Asmnt/Malnutr-PDOC





- Dietary Evaluation


Malnutrition Findings (Please click <Entered> for more info): 








Nutritional Asmnt/Malnutrition                             Start:  18 11:

19


Text:                                                      Status: Complete    

  


Freq:                                                                          

  


Protocol:                                                                      

  


 Document     18 11:19  SHERRI  (Rec: 18 11:35  SHERRI FAULKNER-

FNS1)


 Nutritional Asmnt/Malnutrition


     Patient General Information


      Nutritional Screening                      High Risk


      Diagnosis                                  dehydration


      Pertinent Medical Hx/Surgical Hx           CAD, Hypertension and GERD.


      Subjective Information                     Patient was transferred from


                                                 [facility] unit. Swallow evaluation


                                                 was completed on  with


                                                 recommendation of Pureed diet,


                                                 thin liquids. Patient


                                                 sleeping in bed at time of


                                                 visit.


      Current Diet Order/ Nutrition Support      Pureed


      Patient / S.O                              Not Indicated


      Pertinent Medications                      dulcolax, D5-0.45 NS @50 ml/hr


                                                 , colace, pepcid, cozaar, MOM,


                                                 mag-oxide, theragran, Klor-


                                                 con


      Pertinent Labs                             () Albumin 3.3 (stable)


     Nutritional Hx/Data


      Height                                     1.68 m


      Height (Calculated Centimeters)            167.6


      Current Weight (lbs)                       58.967 kg


      Weight (Calculated Kilograms)              59.0


      Weight (Calculated Grams)                  90994.0


      Ideal Body Weight                          130


      % Ideal Body Weight                        100


      Body Mass Index (BMI)                      20.9


      Recent Weight Change                       No


      Weight Status                              Approriate


     GI Symptoms


      GI Symptoms                                None


      Last BM                                    8/18 x 1


      Difficult in:                              None


      Food Allergies                             No


      Cultural/Ethnic/Hinduism Belief           none indicated


      Usual diet at home                         unknown


      Skin Integrity/Comment:                    Dwayne 14, intact


      Current %PO                                Fair (50-74%)


     Estimated Nutritional Goals


      BEE in Kcals:                              Using Current wt


      Calories/Kcals/Kg                          25-30 kcal/kg using CBW 56.1kg


      Kcals Calculated                           9334-3922 kcal/day


      Protein:                                   Using Current wt


      Protein g/k gm/kg


      Protein Calculated                         60 gm/day


      Fluid: ml                                  4796-8395 ml/day or per MD d/t


                                                 dehydration


     Nutritional Problem


      1. Problem


       Problem                                   Inadequate oral intake related


                                                 to


       Etiology                                  possible poor appetite aeb


       Signs/Symptoms:                           Oral intake ~50% of meals


     Intervention/Recommendation


      Comments                                   1. Continue pureed diet as


                                                 tolerated by patient as it is


                                                 the texture that was


                                                 recommended by SLP swallow


                                                 evaluation.


                                                 2. 1:1 feeder as recommended


                                                 by SLP.


                                                 F/U as Moderate risk in 3-5


                                                 days (-)


     Expected Outcomes/Goals


      Expected Outcomes/Goals                    oral intake >75% of meals,


                                                 weight stable, nutrition


                                                 related labs WNL

## 2018-08-19 NOTE — INFECTIOUS DISEASE PROG NOTE
Infectious Disease Subjective





- Review of Systems


Service Date: 18


Subjective: 





No fever.





Infectious Disease Objective





- Results


Result Diagrams: 


 18 04:35





 18 04:35


Recent Labs: 


 Laboratory Last Values











WBC  6.4 Th/cmm (4.8-10.8)  D 18  04:35    


 


RBC  4.75 Mil/cmm (3.80-5.20)   18  04:35    


 


Hgb  14.4 gm/dL (12-16)   18  04:35    


 


Hct  42.6 % (41.0-60)   18  04:35    


 


MCV  89.7 fl ()   18  04:35    


 


MCH  30.3 pg (27.0-31.0)   18  04:35    


 


MCHC Differential  33.8 pg (28.0-36.0)   18  04:35    


 


RDW  13.3 % (11.5-20.0)   18  04:35    


 


Plt Count  209 Th/cmm (150-400)   18  04:35    


 


MPV  8.8 fl  18  04:35    


 


Neutrophils %  46.9 % (40.0-80.0)   18  04:35    


 


Lymphocytes %  41.2 % (20.0-50.0)   18  04:35    


 


Monocytes %  10.2 % (2.0-10.0)  H  18  04:35    


 


Eosinophils %  1.4 % (0.0-5.0)   18  04:35    


 


Basophils %  0.3 % (0.0-2.0)   18  04:35    


 


Sodium  137 mEq/L (136-145)   18  04:35    


 


Potassium  3.6 mEq/L (3.5-5.1)   18  04:35    


 


Chloride  106 mEq/L ()   18  04:35    


 


Carbon Dioxide  25.5 mEq/L (21.0-31.0)   18  04:35    


 


Anion Gap  9.1  (7.0-16.0)   18  04:35    


 


BUN  17 mg/dL (7-25)   18  04:35    


 


Creatinine  0.8 mg/dL (0.6-1.2)   18  04:35    


 


Est GFR ( Amer)  TNP   18  04:35    


 


Est GFR (Non-Af Amer)  TNP   18  04:35    


 


BUN/Creatinine Ratio  21.3   18  04:35    


 


Glucose  105 mg/dL ()   18  04:35    


 


POC Glucose  88 MG/DL (70 - 105)   08/15/18  16:10    


 


Calcium  9.2 mg/dL (8.6-10.3)   18  04:35    


 


Total Bilirubin  0.5 mg/dL (0.3-1.0)   18  04:35    


 


AST  12 U/L (13-39)  L  18  04:35    


 


ALT  7 U/L (7-52)   18  04:35    


 


Alkaline Phosphatase  52 U/L ()   18  04:35    


 


Total Protein  5.9 gm/dL (6.0-8.3)  L  18  04:35    


 


Albumin  3.3 gm/dL (3.7-5.3)  L  18  04:35    


 


Globulin  2.6 gm/dL  18  04:35    


 


Albumin/Globulin Ratio  1.3  (1.0-1.8)   18  04:35    














- Physical Exam


Vitals and I&O: 


 Vital Signs











Temp  96.8 F   18 16:00


 


Pulse  64   18 16:19


 


Resp  18   18 16:00


 


BP  130/52   18 16:19


 


Pulse Ox  97   18 16:00








 Intake & Output











 18





 18:59 06:59 18:59


 


Intake Total 1050 1750 


 


Balance 1050 1750 


 


Weight (lbs)  59.738 kg 


 


Intake:   


 


  Intake, IV Amount 1050 1000 


 


    D5-0.45NS 1,000 ml @ 50 1000 1000 





    mls/hr IV .Q20H [facility] Rx#:   





    [id number]   


 


    cefTRIAXone 1 gm In 50  





    Sodium Chloride 0.9% 50   





    ml @ 100 mls/hr IV Q24HR   





    [facility] Rx#:[id number]   


 


  Oral  750 


 


Other:   


 


  # Voids  2 


 


  # Bowel Movements  1 


 


  Weight Source  Bedscale 











Active Medications: 


Current Medications





Amlodipine Besylate (Norvasc)  10 mg PO HS [facility]


   Stop: 10/15/18 20:59


   Last Admin: 18 20:48 Dose:  10 mg


Bisacodyl (Dulcolax 10 Mg Supp)  10 mg RC DAILY PRN


   PRN Reason: Constipation


   Stop: 10/15/18 09:02


Clonidine HCl (Catapres-Tts-2)  1 patch TD Fr [facility]


   Stop: 10/16/18 09:02


   Last Admin: 18 10:10 Dose:  1 patch


Clopidogrel Bisulfate (Plavix)  75 mg PO DAILY [facility]


   Stop: 10/16/18 08:59


   Last Admin: 18 08:11 Dose:  75 mg


Divalproex Sodium (Depakote Dr)  125 mg PO DAILY [facility]; Protocol


   Stop: 10/16/18 08:59


   Last Admin: 18 08:11 Dose:  125 mg


Divalproex Sodium (Depakote Dr)  125 mg PO HS [facility]; Protocol


   Stop: 10/15/18 20:59


   Last Admin: 18 20:49 Dose:  125 mg


Docusate Sodium (Colace)  100 mg PO BID [facility]


   Stop: 10/15/18 16:59


   Last Admin: 18 16:18 Dose:  100 mg


Famotidine (Pepcid)  20 mg PO QAM [facility]


   Stop: 10/16/18 08:59


   Last Admin: 18 08:11 Dose:  20 mg


Hydrochlorothiazide (Hctz)  25 mg PO DAILY [facility]


   Stop: 10/16/18 08:59


   Last Admin: 18 08:11 Dose:  25 mg


Dextrose/Sodium Chloride (D5-0.45ns)  1,000 mls @ 50 mls/hr IV .Q20H [facility]


   Stop: 10/14/18 16:29


   Last Admin: 18 08:17 Dose:  50 mls/hr


Ceftriaxone Sodium 1 gm/ (Sodium Chloride)  50 mls @ 100 mls/hr IV Q24HR [facility]


   Last Admin: 18 16:18 Dose:  100 mls/hr


Lorazepam (Ativan)  0.5 mg PO Q4HR [facility]; Protocol


   PRN Reason: Anxiety


   Stop: 10/15/18 09:02


   Last Admin: 18 12:28 Dose:  0.5 mg


Losartan Potassium (Cozaar)  100 mg PO DAILY [facility]


   Stop: 10/16/18 08:59


   Last Admin: 18 08:10 Dose:  100 mg


Magnesium Hydroxide (Milk Of Magnesia)  30 ml PO HS [facility]


   Stop: 10/15/18 20:59


   Last Admin: 18 20:49 Dose:  30 ml


Magnesium Oxide (Mag-Oxide)  400 mg PO DAILY [facility]


   Stop: 10/16/18 08:59


   Last Admin: 18 08:11 Dose:  400 mg


Medroxyprogesterone Acetate (Provera)  10 mg PO DAILY [facility]; Protocol


   Stop: 10/16/18 08:59


   Last Admin: 18 08:10 Dose:  10 mg


Metoprolol Tartrate (Lopressor)  100 mg PO BID [facility]


   Stop: 10/15/18 16:59


   Last Admin: 18 16:19 Dose:  100 mg


Multivitamins/Vitamin C (Theragran)  1 tab PO DAILY [facility]


   Stop: 10/16/18 08:59


   Last Admin: 18 08:11 Dose:  1 tab


Potassium Chloride (Klor-Con)  10 meq PO BID [facility]


   Stop: 10/15/18 16:59


   Last Admin: 18 16:19 Dose:  10 meq


Quetiapine Fumarate (Seroquel)  12.5 mg PO HS [facility]; Protocol


   Stop: 10/15/18 20:59


   Last Admin: 18 20:49 Dose:  12.5 mg


Zolpidem Tartrate (Ambien)  5 mg PO HS PRN


   PRN Reason: Insomnia


   Stop: 10/15/18 09:02








General: no acute distress, well developed, well nourished


HEENT: atraumatic, normocephalic, PERRLA, EOMI


Neck: supple, no thyromegaly


Cardiovascular: S1S2, regular


Lungs: clear to auscultation bilaterally, clear to percussion


Abdomen: soft, no tender, no distended, no mass


Extremities: no cyanosis, no clubbing, no edema


Neurological: awake, alert, oriented


Skin: intact





Infectious Disease Assmt/Plan





- Assessment


Assessment: 


1.  UTI.  Escherichia coli UTI.


2.  Dementia.


3.  Hypertension.


4.  Arthritis.











- Plan


Plan: 





CPM. Antibiotics, rocephin.





Nutritional Asmnt/Malnutr-PDOC





- Dietary Evaluation


Malnutrition Findings (Please click <Entered> for more info): 








Nutritional Asmnt/Malnutrition                             Start:  18 11:

19


Text:                                                      Status: Complete    

  


Freq:                                                                          

  


Protocol:                                                                      

  


 Document     18 11:19  SHERRI  (Rec: 18 11:35  SHERRI  KAUSHIK-

FNS1)


 Nutritional Asmnt/Malnutrition


     Patient General Information


      Nutritional Screening                      High Risk


      Diagnosis                                  dehydration


      Pertinent Medical Hx/Surgical Hx           CAD, Hypertension and GERD.


      Subjective Information                     Patient was transferred from


                                                 [facility] unit. Swallow evaluation


                                                 was completed on  with


                                                 recommendation of Pureed diet,


                                                 thin liquids. Patient


                                                 sleeping in bed at time of


                                                 visit.


      Current Diet Order/ Nutrition Support      Pureed


      Patient / S.O                              Not Indicated


      Pertinent Medications                      dulcolax, D5-0.45 NS @50 ml/hr


                                                 , colace, pepcid, cozaar, MOM,


                                                 mag-oxide, theragran, Klor-


                                                 con


      Pertinent Labs                             () Albumin 3.3 (stable)


     Nutritional Hx/Data


      Height                                     1.68 m


      Height (Calculated Centimeters)            167.6


      Current Weight (lbs)                       58.967 kg


      Weight (Calculated Kilograms)              59.0


      Weight (Calculated Grams)                  08416.0


      Ideal Body Weight                          130


      % Ideal Body Weight                        100


      Body Mass Index (BMI)                      20.9


      Recent Weight Change                       No


      Weight Status                              Approriate


     GI Symptoms


      GI Symptoms                                None


      Last BM                                    8/18 x 1


      Difficult in:                              None


      Food Allergies                             No


      Cultural/Ethnic/Scientologist Belief           none indicated


      Usual diet at home                         unknown


      Skin Integrity/Comment:                    Dwayne 14, intact


      Current %PO                                Fair (50-74%)


     Estimated Nutritional Goals


      BEE in Kcals:                              Using Current wt


      Calories/Kcals/Kg                          25-30 kcal/kg using CBW 56.1kg


      Kcals Calculated                           3094-7497 kcal/day


      Protein:                                   Using Current wt


      Protein g/k gm/kg


      Protein Calculated                         60 gm/day


      Fluid: ml                                  3518-1028 ml/day or per MD d/t


                                                 dehydration


     Nutritional Problem


      1. Problem


       Problem                                   Inadequate oral intake related


                                                 to


       Etiology                                  possible poor appetite aeb


       Signs/Symptoms:                           Oral intake ~50% of meals


     Intervention/Recommendation


      Comments                                   1. Continue pureed diet as


                                                 tolerated by patient as it is


                                                 the texture that was


                                                 recommended by SLP swallow


                                                 evaluation.


                                                 2. 1:1 feeder as recommended


                                                 by SLP.


                                                 F/U as Moderate risk in 3-5


                                                 days (-)


     Expected Outcomes/Goals


      Expected Outcomes/Goals                    oral intake >75% of meals,


                                                 weight stable, nutrition


                                                 related labs WNL

## 2018-08-19 NOTE — INFECTIOUS DISEASE PROG NOTE
Infectious Disease Subjective





- Review of Systems


Service Date: 18





Infectious Disease Objective





- Results


Result Diagrams: 


 18 04:35





 18 04:35


Recent Labs: 


 Laboratory Last Values











WBC  6.4 Th/cmm (4.8-10.8)  D 18  04:35    


 


RBC  4.75 Mil/cmm (3.80-5.20)   18  04:35    


 


Hgb  14.4 gm/dL (12-16)   18  04:35    


 


Hct  42.6 % (41.0-60)   18  04:35    


 


MCV  89.7 fl ()   18  04:35    


 


MCH  30.3 pg (27.0-31.0)   18  04:35    


 


MCHC Differential  33.8 pg (28.0-36.0)   18  04:35    


 


RDW  13.3 % (11.5-20.0)   18  04:35    


 


Plt Count  209 Th/cmm (150-400)   18  04:35    


 


MPV  8.8 fl  18  04:35    


 


Neutrophils %  46.9 % (40.0-80.0)   18  04:35    


 


Lymphocytes %  41.2 % (20.0-50.0)   18  04:35    


 


Monocytes %  10.2 % (2.0-10.0)  H  18  04:35    


 


Eosinophils %  1.4 % (0.0-5.0)   18  04:35    


 


Basophils %  0.3 % (0.0-2.0)   18  04:35    


 


Sodium  137 mEq/L (136-145)   18  04:35    


 


Potassium  3.6 mEq/L (3.5-5.1)   18  04:35    


 


Chloride  106 mEq/L ()   18  04:35    


 


Carbon Dioxide  25.5 mEq/L (21.0-31.0)   18  04:35    


 


Anion Gap  9.1  (7.0-16.0)   18  04:35    


 


BUN  17 mg/dL (7-25)   18  04:35    


 


Creatinine  0.8 mg/dL (0.6-1.2)   18  04:35    


 


Est GFR ( Amer)  TNP   18  04:35    


 


Est GFR (Non-Af Amer)  TNP   18  04:35    


 


BUN/Creatinine Ratio  21.3   18  04:35    


 


Glucose  105 mg/dL ()   18  04:35    


 


POC Glucose  88 MG/DL (70 - 105)   08/15/18  16:10    


 


Calcium  9.2 mg/dL (8.6-10.3)   18  04:35    


 


Total Bilirubin  0.5 mg/dL (0.3-1.0)   18  04:35    


 


AST  12 U/L (13-39)  L  18  04:35    


 


ALT  7 U/L (7-52)   18  04:35    


 


Alkaline Phosphatase  52 U/L ()   18  04:35    


 


Total Protein  5.9 gm/dL (6.0-8.3)  L  18  04:35    


 


Albumin  3.3 gm/dL (3.7-5.3)  L  18  04:35    


 


Globulin  2.6 gm/dL  18  04:35    


 


Albumin/Globulin Ratio  1.3  (1.0-1.8)   18  04:35    














- Physical Exam


Vitals and I&O: 


 Vital Signs











Temp  96.8 F   18 16:00


 


Pulse  64   18 16:19


 


Resp  18   18 16:00


 


BP  130/52   18 16:19


 


Pulse Ox  97   18 16:00








 Intake & Output











 18





 18:59 06:59 18:59


 


Intake Total 1050 1750 


 


Balance 1050 1750 


 


Weight (lbs)  59.738 kg 


 


Intake:   


 


  Intake, IV Amount 1050 1000 


 


    D5-0.45NS 1,000 ml @ 50 1000 1000 





    mls/hr IV .Q20H RUSS Rx#:   





    [id number]   


 


    cefTRIAXone 1 gm In 50  





    Sodium Chloride 0.9% 50   





    ml @ 100 mls/hr IV Q24HR   





    RUSS Rx#:[id number]   


 


  Oral  750 


 


Other:   


 


  # Voids  2 


 


  # Bowel Movements  1 


 


  Weight Source  Bedscale 











Active Medications: 


Current Medications





Amlodipine Besylate (Norvasc)  10 mg PO HS RUSS


   Stop: 10/15/18 20:59


   Last Admin: 18 20:48 Dose:  10 mg


Bisacodyl (Dulcolax 10 Mg Supp)  10 mg RC DAILY PRN


   PRN Reason: Constipation


   Stop: 10/15/18 09:02


Clonidine HCl (Catapres-Tts-2)  1 patch TD Fr RUSS


   Stop: 10/16/18 09:02


   Last Admin: 18 10:10 Dose:  1 patch


Clopidogrel Bisulfate (Plavix)  75 mg PO DAILY RUSS


   Stop: 10/16/18 08:59


   Last Admin: 18 08:11 Dose:  75 mg


Divalproex Sodium (Depakote Dr)  125 mg PO DAILY Replaced by [facility]; Protocol


   Stop: 10/16/18 08:59


   Last Admin: 18 08:11 Dose:  125 mg


Divalproex Sodium (Depakote Dr)  125 mg PO HS Replaced by [facility]; Protocol


   Stop: 10/15/18 20:59


   Last Admin: 18 20:49 Dose:  125 mg


Docusate Sodium (Colace)  100 mg PO BID Replaced by [facility]


   Stop: 10/15/18 16:59


   Last Admin: 18 16:18 Dose:  100 mg


Famotidine (Pepcid)  20 mg PO QAM RUSS


   Stop: 10/16/18 08:59


   Last Admin: 18 08:11 Dose:  20 mg


Hydrochlorothiazide (Hctz)  25 mg PO DAILY RUSS


   Stop: 10/16/18 08:59


   Last Admin: 18 08:11 Dose:  25 mg


Dextrose/Sodium Chloride (D5-0.45ns)  1,000 mls @ 50 mls/hr IV .Q20H RUSS


   Stop: 10/14/18 16:29


   Last Admin: 18 08:17 Dose:  50 mls/hr


Ceftriaxone Sodium 1 gm/ (Sodium Chloride)  50 mls @ 100 mls/hr IV Q24HR Replaced by [facility]


   Last Admin: 18 16:18 Dose:  100 mls/hr


Lorazepam (Ativan)  0.5 mg PO Q4HR PRN; Protocol


   PRN Reason: Anxiety


   Stop: 10/15/18 09:02


   Last Admin: 18 12:28 Dose:  0.5 mg


Losartan Potassium (Cozaar)  100 mg PO DAILY RUSS


   Stop: 10/16/18 08:59


   Last Admin: 18 08:10 Dose:  100 mg


Magnesium Hydroxide (Milk Of Magnesia)  30 ml PO HS RUSS


   Stop: 10/15/18 20:59


   Last Admin: 18 20:49 Dose:  30 ml


Magnesium Oxide (Mag-Oxide)  400 mg PO DAILY RUSS


   Stop: 10/16/18 08:59


   Last Admin: 18 08:11 Dose:  400 mg


Medroxyprogesterone Acetate (Provera)  10 mg PO DAILY Replaced by [facility]; Protocol


   Stop: 10/16/18 08:59


   Last Admin: 18 08:10 Dose:  10 mg


Metoprolol Tartrate (Lopressor)  100 mg PO BID RUSS


   Stop: 10/15/18 16:59


   Last Admin: 18 16:19 Dose:  100 mg


Multivitamins/Vitamin C (Theragran)  1 tab PO DAILY Replaced by [facility]


   Stop: 10/16/18 08:59


   Last Admin: 18 08:11 Dose:  1 tab


Potassium Chloride (Klor-Con)  10 meq PO BID RUSS


   Stop: 10/15/18 16:59


   Last Admin: 18 16:19 Dose:  10 meq


Quetiapine Fumarate (Seroquel)  12.5 mg PO HS RUSS; Protocol


   Stop: 10/15/18 20:59


   Last Admin: 18 20:49 Dose:  12.5 mg


Zolpidem Tartrate (Ambien)  5 mg PO HS PRN


   PRN Reason: Insomnia


   Stop: 10/15/18 09:02











Infectious Disease Assmt/Plan





- Assessment


Assessment: 


1.  UTI.  Escherichia coli UTI.


2.  Dementia.


3.  Hypertension.


4.  Arthritis.











- Plan


Plan: 





CPM. Antibiotics, rocephin.





Nutritional Asmnt/Malnutr-PDOC





- Dietary Evaluation


Malnutrition Findings (Please click <Entered> for more info): 








Nutritional Asmnt/Malnutrition                             Start:  18 11:

19


Text:                                                      Status: Complete    

  


Freq:                                                                          

  


Protocol:                                                                      

  


 Document     18 11:19  SHERRI  (Rec: 18 11:35  SHERRI FAULKNER-

FNS1)


 Nutritional Asmnt/Malnutrition


     Patient General Information


      Nutritional Screening                      High Risk


      Diagnosis                                  dehydration


      Pertinent Medical Hx/Surgical Hx           CAD, Hypertension and GERD.


      Subjective Information                     Patient was transferred from


                                                 [facility] unit. Swallow evaluation


                                                 was completed on  with


                                                 recommendation of Pureed diet,


                                                 thin liquids. Patient


                                                 sleeping in bed at time of


                                                 visit.


      Current Diet Order/ Nutrition Support      Pureed


      Patient / S.O                              Not Indicated


      Pertinent Medications                      dulcolax, D5-0.45 NS @50 ml/hr


                                                 , colace, pepcid, cozaar, MOM,


                                                 mag-oxide, theragran, Klor-


                                                 con


      Pertinent Labs                             () Albumin 3.3 (stable)


     Nutritional Hx/Data


      Height                                     1.68 m


      Height (Calculated Centimeters)            167.6


      Current Weight (lbs)                       58.967 kg


      Weight (Calculated Kilograms)              59.0


      Weight (Calculated Grams)                  87382.0


      Ideal Body Weight                          130


      % Ideal Body Weight                        100


      Body Mass Index (BMI)                      20.9


      Recent Weight Change                       No


      Weight Status                              Approriate


     GI Symptoms


      GI Symptoms                                None


      Last BM                                    8/18 x 1


      Difficult in:                              None


      Food Allergies                             No


      Cultural/Ethnic/Yarsani Belief           none indicated


      Usual diet at home                         unknown


      Skin Integrity/Comment:                    Dwayne 14, intact


      Current %PO                                Fair (50-74%)


     Estimated Nutritional Goals


      BEE in Kcals:                              Using Current wt


      Calories/Kcals/Kg                          25-30 kcal/kg using CBW 56.1kg


      Kcals Calculated                           3172-4696 kcal/day


      Protein:                                   Using Current wt


      Protein g/k gm/kg


      Protein Calculated                         60 gm/day


      Fluid: ml                                  5092-7476 ml/day or per MD d/t


                                                 dehydration


     Nutritional Problem


      1. Problem


       Problem                                   Inadequate oral intake related


                                                 to


       Etiology                                  possible poor appetite aeb


       Signs/Symptoms:                           Oral intake ~50% of meals


     Intervention/Recommendation


      Comments                                   1. Continue pureed diet as


                                                 tolerated by patient as it is


                                                 the texture that was


                                                 recommended by SLP swallow


                                                 evaluation.


                                                 2. 1:1 feeder as recommended


                                                 by SLP.


                                                 F/U as Moderate risk in 3-5


                                                 days (-)


     Expected Outcomes/Goals


      Expected Outcomes/Goals                    oral intake >75% of meals,


                                                 weight stable, nutrition


                                                 related labs WNL

## 2018-08-20 LAB
ANION GAP SERPL CALC-SCNC: 8.6 MMOL/L (ref 7–16)
BASOPHILS # BLD AUTO: 0 TH/CUMM (ref 0–0.2)
BASOPHILS NFR BLD AUTO: 0.3 % (ref 0–2)
BUN SERPL-MCNC: 21 MG/DL (ref 7–25)
CALCIUM SERPL-MCNC: 9.5 MG/DL (ref 8.6–10.3)
CHLORIDE SERPL-SCNC: 102 MEQ/L (ref 98–107)
CO2 SERPL-SCNC: 27.3 MEQ/L (ref 21–31)
CREAT SERPL-MCNC: 1.1 MG/DL (ref 0.6–1.2)
EOSINOPHIL # BLD AUTO: 0.2 TH/CMM (ref 0.1–0.4)
EOSINOPHIL NFR BLD AUTO: 2.7 % (ref 0–5)
ERYTHROCYTE [DISTWIDTH] IN BLOOD BY AUTOMATED COUNT: 13.7 % (ref 11.5–20)
GLUCOSE SERPL-MCNC: 108 MG/DL (ref 70–105)
HCT VFR BLD CALC: 45.8 % (ref 41–60)
HGB BLD-MCNC: 15.1 GM/DL (ref 12–16)
LYMPHOCYTE AB SER FC-ACNC: 2.1 TH/CMM (ref 1.5–3)
LYMPHOCYTES NFR BLD AUTO: 35.6 % (ref 20–50)
MCH RBC QN AUTO: 29.4 PG (ref 27–31)
MCHC RBC AUTO-ENTMCNC: 32.9 PG (ref 28–36)
MCV RBC AUTO: 89.5 FL (ref 81–100)
MONOCYTES # BLD AUTO: 0.5 TH/CMM (ref 0.3–1)
MONOCYTES NFR BLD AUTO: 9.2 % (ref 2–10)
NEUTROPHILS # BLD: 3 TH/CMM (ref 1.8–8)
NEUTROPHILS NFR BLD AUTO: 52.2 % (ref 40–80)
PLATELET # BLD: 221 TH/CMM (ref 150–400)
PMV BLD AUTO: 8.2 FL
POTASSIUM SERPL-SCNC: 3.9 MEQ/L (ref 3.5–5.1)
RBC # BLD AUTO: 5.11 MIL/CMM (ref 3.8–5.2)
SODIUM SERPL-SCNC: 134 MEQ/L (ref 136–145)
WBC # BLD AUTO: 5.8 TH/CMM (ref 4.8–10.8)

## 2018-08-20 RX ADMIN — POTASSIUM CHLORIDE SCH: 750 TABLET, EXTENDED RELEASE ORAL at 08:42

## 2018-08-20 NOTE — INFECTIOUS DISEASE PROG NOTE
Infectious Disease Subjective





- Review of Systems


Service Date: 18


Subjective: 





No fever.





Infectious Disease Objective





- Results


Result Diagrams: 


 18 08:30





 18 08:30


Recent Labs: 


 Laboratory Last Values











WBC  5.8 Th/cmm (4.8-10.8)   18  08:30    


 


RBC  5.11 Mil/cmm (3.80-5.20)   18  08:30    


 


Hgb  15.1 gm/dL (12-16)   18  08:30    


 


Hct  45.8 % (41.0-60)   18  08:30    


 


MCV  89.5 fl ()   18  08:30    


 


MCH  29.4 pg (27.0-31.0)   18  08:30    


 


MCHC Differential  32.9 pg (28.0-36.0)   18  08:30    


 


RDW  13.7 % (11.5-20.0)   18  08:30    


 


Plt Count  221 Th/cmm (150-400)   18  08:30    


 


MPV  8.2 fl  18  08:30    


 


Neutrophils %  52.2 % (40.0-80.0)   18  08:30    


 


Lymphocytes %  35.6 % (20.0-50.0)   18  08:30    


 


Monocytes %  9.2 % (2.0-10.0)   18  08:30    


 


Eosinophils %  2.7 % (0.0-5.0)   18  08:30    


 


Basophils %  0.3 % (0.0-2.0)   18  08:30    


 


Sodium  134 mEq/L (136-145)  L  18  08:30    


 


Potassium  3.9 mEq/L (3.5-5.1)   18  08:30    


 


Chloride  102 mEq/L ()   18  08:30    


 


Carbon Dioxide  27.3 mEq/L (21.0-31.0)   18  08:30    


 


Anion Gap  8.6  (7.0-16.0)   18  08:30    


 


BUN  21 mg/dL (7-25)   18  08:30    


 


Creatinine  1.1 mg/dL (0.6-1.2)   18  08:30    


 


Est GFR ( Amer)  TNP   18  08:30    


 


Est GFR (Non-Af Amer)  TNP   18  08:30    


 


BUN/Creatinine Ratio  19.1   18  08:30    


 


Glucose  108 mg/dL ()  H  18  08:30    


 


POC Glucose  88 MG/DL (70 - 105)   08/15/18  16:10    


 


Calcium  9.5 mg/dL (8.6-10.3)   18  08:30    


 


Total Bilirubin  0.5 mg/dL (0.3-1.0)   18  04:35    


 


AST  12 U/L (13-39)  L  18  04:35    


 


ALT  7 U/L (7-52)   18  04:35    


 


Alkaline Phosphatase  52 U/L ()   18  04:35    


 


Total Protein  5.9 gm/dL (6.0-8.3)  L  18  04:35    


 


Albumin  3.3 gm/dL (3.7-5.3)  L  18  04:35    


 


Globulin  2.6 gm/dL  18  04:35    


 


Albumin/Globulin Ratio  1.3  (1.0-1.8)   18  04:35    














- Physical Exam


Vitals and I&O: 


 Vital Signs











Temp  98.1 F   18 12:13


 


Pulse  80   18 12:13


 


Resp  17   18 12:13


 


BP  97/70   18 12:13


 


Pulse Ox  100   18 12:13








 Intake & Output











 18





 18:59 06:59 18:59


 


Intake Total 550  


 


Balance 550  


 


Weight (lbs) 59.738 kg 59.421 kg 


 


Intake:   


 


  Oral 550  


 


Other:   


 


  # Voids 3 3 


 


  # Bowel Movements 1  


 


  Weight Source Bedscale Estimated 











Active Medications: 


Current Medications





Amlodipine Besylate (Norvasc)  10 mg PO HS RUSS


   Stop: 10/15/18 20:59


   Last Admin: 18 20:05 Dose:  10 mg


Bisacodyl (Dulcolax 10 Mg Supp)  10 mg RC DAILY PRN


   PRN Reason: Constipation


   Stop: 10/15/18 09:02


Clonidine HCl (Catapres-Tts-2)  1 patch TD Fr RUSS


   Stop: 10/16/18 09:02


   Last Admin: 18 10:10 Dose:  1 patch


Clopidogrel Bisulfate (Plavix)  75 mg PO DAILY [facility]


   Stop: 10/16/18 08:59


   Last Admin: 18 08:40 Dose:  Not Given


Divalproex Sodium (Depakote Dr)  125 mg PO DAILY [facility]; Protocol


   Stop: 10/16/18 08:59


   Last Admin: 18 08:40 Dose:  Not Given


Divalproex Sodium (Depakote Dr)  125 mg PO HS [facility]; Protocol


   Stop: 10/15/18 20:59


   Last Admin: 18 20:07 Dose:  125 mg


Docusate Sodium (Colace)  100 mg PO BID [facility]


   Stop: 10/15/18 16:59


   Last Admin: 18 08:41 Dose:  Not Given


Famotidine (Pepcid)  20 mg PO QAM RUSS


   Stop: 10/16/18 08:59


   Last Admin: 18 08:41 Dose:  Not Given


Hydrochlorothiazide (Hctz)  25 mg PO DAILY RUSS


   Stop: 10/16/18 08:59


   Last Admin: 18 08:41 Dose:  Not Given


Dextrose/Sodium Chloride (D5-0.45ns)  1,000 mls @ 50 mls/hr IV .Q20H [facility]


   Stop: 10/14/18 16:29


   Last Admin: 18 08:17 Dose:  50 mls/hr


Ceftriaxone Sodium 1 gm/ (Sodium Chloride)  50 mls @ 100 mls/hr IV Q24HR [facility]


   Last Admin: 18 16:18 Dose:  100 mls/hr


Lorazepam (Ativan)  0.5 mg PO Q4HR PRN; Protocol


   PRN Reason: Anxiety


   Stop: 10/15/18 09:02


   Last Admin: 18 12:28 Dose:  0.5 mg


Losartan Potassium (Cozaar)  100 mg PO DAILY [facility]


   Stop: 10/16/18 08:59


   Last Admin: 18 08:41 Dose:  Not Given


Magnesium Hydroxide (Milk Of Magnesia)  30 ml PO HS RUSS


   Stop: 10/15/18 20:59


   Last Admin: 18 20:15 Dose:  30 ml


Magnesium Oxide (Mag-Oxide)  400 mg PO DAILY [facility]


   Stop: 10/16/18 08:59


   Last Admin: 18 08:41 Dose:  Not Given


Medroxyprogesterone Acetate (Provera)  10 mg PO DAILY [facility]; Protocol


   Stop: 10/16/18 08:59


   Last Admin: 18 08:41 Dose:  Not Given


Metoprolol Tartrate (Lopressor)  100 mg PO BID RUSS


   Stop: 10/15/18 16:59


   Last Admin: 18 08:41 Dose:  Not Given


Multivitamins/Vitamin C (Theragran)  1 tab PO DAILY [facility]


   Stop: 10/16/18 08:59


   Last Admin: 18 08:42 Dose:  Not Given


Potassium Chloride (Klor-Con)  10 meq PO BID RUSS


   Stop: 10/15/18 16:59


   Last Admin: 18 08:42 Dose:  Not Given


Quetiapine Fumarate (Seroquel)  12.5 mg PO HS [facility]; Protocol


   Stop: 10/15/18 20:59


   Last Admin: 18 20:05 Dose:  12.5 mg


Zolpidem Tartrate (Ambien)  5 mg PO HS PRN


   PRN Reason: Insomnia


   Stop: 10/15/18 09:02








General: no acute distress, well developed, well nourished


HEENT: atraumatic, normocephalic, PERRLA, EOMI


Neck: supple, no thyromegaly


Cardiovascular: S1S2, regular


Lungs: clear to auscultation bilaterally, clear to percussion


Abdomen: soft, no tender, no distended


Extremities: no cyanosis, no clubbing, no edema


Neurological: awake, alert, oriented


Skin: intact, no subcutaneous nodules





Infectious Disease Assmt/Plan





- Assessment


Assessment: 


1.  UTI.  Escherichia coli UTI.


2.  Dementia.


3.  Hypertension.


4.  Arthritis.











- Plan


Plan: 





CPM. Antibiotics, rocephin.





Nutritional Asmnt/Malnutr-PDOC





- Dietary Evaluation


Malnutrition Findings (Please click <Entered> for more info): 








Nutritional Asmnt/Malnutrition                             Start:  18 11:

19


Text:                                                      Status: Complete    

  


Freq:                                                                          

  


Protocol:                                                                      

  


 Document     18 11:19  SHERRI  (Rec: 18 11:35  LEATHAJUSTINE FAULKNER-

FNS1)


 Nutritional Asmnt/Malnutrition


     Patient General Information


      Nutritional Screening                      High Risk


      Diagnosis                                  dehydration


      Pertinent Medical Hx/Surgical Hx           CAD, Hypertension and GERD.


      Subjective Information                     Patient was transferred from


                                                 [facility] unit. Swallow evaluation


                                                 was completed on  with


                                                 recommendation of Pureed diet,


                                                 thin liquids. Patient


                                                 sleeping in bed at time of


                                                 visit.


      Current Diet Order/ Nutrition Support      Pureed


      Patient / S.O                              Not Indicated


      Pertinent Medications                      dulcolax, D5-0.45 NS @50 ml/hr


                                                 , colace, pepcid, cozaar, MOM,


                                                 mag-oxide, theragran, Klor-


                                                 con


      Pertinent Labs                             () Albumin 3.3 (stable)


     Nutritional Hx/Data


      Height                                     1.68 m


      Height (Calculated Centimeters)            167.6


      Current Weight (lbs)                       58.967 kg


      Weight (Calculated Kilograms)              59.0


      Weight (Calculated Grams)                  81898.0


      Ideal Body Weight                          130


      % Ideal Body Weight                        100


      Body Mass Index (BMI)                      20.9


      Recent Weight Change                       No


      Weight Status                              Approriate


     GI Symptoms


      GI Symptoms                                None


      Last BM                                    8/18 x 1


      Difficult in:                              None


      Food Allergies                             No


      Cultural/Ethnic/Pentecostal Belief           none indicated


      Usual diet at home                         unknown


      Skin Integrity/Comment:                    Dwayne 14, intact


      Current %PO                                Fair (50-74%)


     Estimated Nutritional Goals


      BEE in Kcals:                              Using Current wt


      Calories/Kcals/Kg                          25-30 kcal/kg using CBW 56.1kg


      Kcals Calculated                           3925-4580 kcal/day


      Protein:                                   Using Current wt


      Protein g/k gm/kg


      Protein Calculated                         60 gm/day


      Fluid: ml                                  8506-3925 ml/day or per MD d/t


                                                 dehydration


     Nutritional Problem


      1. Problem


       Problem                                   Inadequate oral intake related


                                                 to


       Etiology                                  possible poor appetite aeb


       Signs/Symptoms:                           Oral intake ~50% of meals


     Intervention/Recommendation


      Comments                                   1. Continue pureed diet as


                                                 tolerated by patient as it is


                                                 the texture that was


                                                 recommended by SLP swallow


                                                 evaluation.


                                                 2. 1:1 feeder as recommended


                                                 by SLP.


                                                 F/U as Moderate risk in 3-5


                                                 days (-)


     Expected Outcomes/Goals


      Expected Outcomes/Goals                    oral intake >75% of meals,


                                                 weight stable, nutrition


                                                 related labs WNL